# Patient Record
(demographics unavailable — no encounter records)

---

## 2024-10-07 NOTE — HISTORY OF PRESENT ILLNESS
[___ Month(s) Ago] : [unfilled] month(s) ago [History of Nephritis] : the patient has a history of nephritis [None] : The patient is currently asymptomatic [FreeTextEntry1] : Reports feeling well. No new concerns. Compliant with Imuran and HCQ. Planning natural pregnancy, LMP :10/6/2024  Denies photosensitivity, malar rash, mouth ulcers, alopecia, dry eyes, dry mouth, joint pains, Raynaud's phenomenon, puffy fingers, pleuritic chest pain, SOB , previous blood transfusion, limb weakness, weight loss, anorexia, fever, chills, other skin rash [FreeTextEntry3] : 2007 [FreeTextEntry7] :  TERRI, dsDNA, RNP, SSA, SSB, low C3. [FreeTextEntry4] : Azathioprine changed to Myfortic, then Benlysta, and back to Azathioprine

## 2024-10-07 NOTE — ASSESSMENT
[FreeTextEntry1] : 35 yo F PMH SLE (dx 2007) c/b LN class 5 (2007) and b/l hip AVN (s/p core decompression) is being followed up in our clinic since 2017.  # SLE LN  - Class V LN, in remission based on recent urine studies and CMP - No symptoms of SLE flare on history or exam  - Currently on Aza 200 mg daily and plaquenil 400 mg daily, c/w current regimen  - Will recheck SLE labs today  - Advised Ophtho f/u, denies visual symptoms, last seen in 2022, planning to see next month    # Contemplating pregnancy - S/p pregnancy 2023 with IUFD c/b fetal carditis, cholestasis s/p IVIG treatment and decadron  - SSA and SSB positive, B2 GP, ACL negative in 2023 - Followed closely with MFM and peds cards - 11/2023 with no FHR and vaginal delivery  - Plans to become pregnant; Will repeat SSA/RO52. Recommended sooner follow up if pregnant  # SCLE; remission - Stable - Not taking topicals - Follows with Derm - Advised to apply sunscreen   # R back pain; resolved - Suspect mechanical origin  - No red flag symptoms, exam overall unremarkable  - Given hx of pregnancy with prior delivery, there could be an element of secondary OA  - Will check x-rays b/l hips and SI joint - Referral given for PT   # Hyperbilirubinemia: resolved - Diagnosed with intrahepatic cholestasis of pregnancy  - Followed with Hepatology; now off ursodiol with resolved hyperbilirubinemia    # HMT - DEXA 2021, osteopenia - Pt not vaccinated for Covid or flu (refused)  - Pap smear done 5/2023 - Advised Ophtho f/u as above   RTC 3- months, sooner if gets pregnant  Discussed with Dr. Alfie Crabtree PGY4-Rheumatology Fellow

## 2024-10-07 NOTE — PHYSICAL EXAM
[General Appearance - Alert] : alert [General Appearance - In No Acute Distress] : in no acute distress [Sclera] : the sclera and conjunctiva were normal [PERRL With Normal Accommodation] : pupils were equal in size, round, and reactive to light [Extraocular Movements] : extraocular movements were intact [Outer Ear] : the ears and nose were normal in appearance [Examination Of The Oral Cavity] : the lips and gums were normal [Oropharynx] : the oropharynx was normal [] : no respiratory distress [Auscultation Breath Sounds / Voice Sounds] : lungs were clear to auscultation bilaterally [Heart Rate And Rhythm] : heart rate was normal and rhythm regular [Heart Sounds] : normal S1 and S2 [Murmurs] : no murmurs [Edema] : there was no peripheral edema [Bowel Sounds] : normal bowel sounds [Abdomen Soft] : soft [Abdomen Tenderness] : non-tender [No Focal Deficits] : no focal deficits [Impaired Insight] : insight and judgment were intact [Abnormal Walk] : normal gait [Nail Clubbing] : no clubbing  or cyanosis of the fingernails [Musculoskeletal - Swelling] : no joint swelling seen [Motor Tone] : muscle strength and tone were normal [FreeTextEntry1] : no synovitis, 5/5 strength upper and lower extremities bilaterally

## 2025-01-13 NOTE — HISTORY OF PRESENT ILLNESS
[___ Month(s) Ago] : [unfilled] month(s) ago [History of Nephritis] : the patient has a history of nephritis [None] : The patient is currently asymptomatic [FreeTextEntry1] : Jan 2025: Feels well, no new concerns. Still planning for pregnancy in 2-3 months.   Denies photosensitivity, malar rash, mouth ulcers, alopecia, dry eyes, dry mouth, joint pains, Raynaud's phenomenon, puffy fingers, pleuritic chest pain, SOB , previous blood transfusion, limb weakness, weight loss, anorexia, fever, chills, other skin rash oct 2024;Reports feeling well. No new concerns. Compliant with Imuran and HCQ. Planning natural pregnancy, LMP :10/6/2024  Denies photosensitivity, malar rash, mouth ulcers, alopecia, dry eyes, dry mouth, joint pains, Raynaud's phenomenon, puffy fingers, pleuritic chest pain, SOB , previous blood transfusion, limb weakness, weight loss, anorexia, fever, chills, other skin rash [FreeTextEntry3] : 2007 [FreeTextEntry7] :  TERRI, dsDNA, RNP, SSA, SSB, low C3. [FreeTextEntry4] : Azathioprine changed to Myfortic, then Benlysta, and back to Azathioprine

## 2025-01-13 NOTE — ASSESSMENT
[FreeTextEntry1] : 35 yo F PMH SLE (dx 2007) c/b LN class 5 (2007) and b/l hip AVN (s/p core decompression) is being followed up in our clinic since 2017.  # SLE / LN  - Class V LN, in remission based on recent urine studies and CMP - No symptoms of SLE flare on history or exam, , complements . UPCR negative , positive ds DNA - Currently on Aza 200 mg daily and plaquenil 400 mg daily, c/w current regimen  - Advised Ophtho f/u, denies visual symptoms, last seen in 2022, planning to see next month    # Contemplating pregnancy - S/p pregnancy 2023 with IUFD c/b fetal carditis, cholestasis s/p IVIG treatment and decadron  - SSA and SSB positive, B2 GP, ACL negative in 2023 - Followed closely with MFM and peds cards - 11/2023 with no FHR and vaginal delivery  - Plans to become pregnant; Will repeat SSA/RO52. Recommended sooner follow up if pregant -Tasked  for follow up for preconception counselling   # SCLE; remission - Stable - Not taking topicals - Follows with Derm - Advised to apply sunscreen   # R back pain; resolved - Suspect mechanical origin  - No red flag symptoms, exam overall unremarkable  - Given hx of pregnancy with prior delivery, there could be an element of secondary OA  - Will check x-rays b/l hips and SI joint - Referral given for PT   # Hyperbilirubinemia: resolved - Diagnosed with intrahepatic cholestasis of pregnancy  - Followed with Hepatology; now off ursodiol with resolved hyperbilirubinemia    # HMT - DEXA 2021, osteopenia - Pt not vaccinated for Covid or flu (refused)  - Pap smear done 5/2023 - Advised Ophtho f/u as above   RTC 3- months, sooner if gets pregnant. Counselled patient regarding follow up with MFM before pregnancy,  Discussed with Dr. Alfie Crabtree PGY4-Rheumatology Fellow

## 2025-04-07 NOTE — HISTORY OF PRESENT ILLNESS
[___ Month(s) Ago] : [unfilled] month(s) ago [History of Nephritis] : the patient has a history of nephritis [None] : The patient is currently asymptomatic [FreeTextEntry1] : 4/7/2025;  No new symptoms. Doing well. 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   [FreeTextEntry3] : 2007 [FreeTextEntry7] :  TERRI, dsDNA, RNP, SSA, SSB, low C3. [FreeTextEntry4] : Azathioprine changed to Myfortic, then Benlysta, and back to Azathioprine

## 2025-04-07 NOTE — ASSESSMENT
[FreeTextEntry1] : 37 yo F PMH SLE (dx 2007) c/b LN class 5 (2007) and b/l hip AVN (s/p core decompression) is being followed up in our clinic since 2017.  # SLE / LN  - Class V LN, in remission based on recent urine studies and CMP - No symptoms of SLE flare on history or exam, , complements. UPCR negative , positive ds DNA - Currently on Aza 200 mg daily and plaquenil 400 mg daily, c/w current regimen   # Contemplating pregnancy - S/p pregnancy 2023 with IUFD c/b fetal carditis, cholestasis s/p IVIG treatment and decadron  - SSA and SSB positive, B2 GP, ACL negative in 2023 - Followed closely with MFM and peds cards - 11/2023 with no FHR and vaginal delivery  - Plans to become pregnant; Will repeat SSA/RO52. Recommended sooner follow up if pregant -Tasked  for follow up for preconception counselling  -Recommended MFM follow up before next pregnancy planning  # SCLE; remission - Stable - Not taking topicals - Follows with Derm - Advised to apply sunscreen   # R back pain; resolved - Suspect mechanical origin  - No red flag symptoms, exam overall unremarkable  - Given hx of pregnancy with prior delivery, there could be an element of secondary OA  - Will check x-rays b/l hips and SI joint - Referral given for PT   # Hyperbilirubinemia: resolved - Diagnosed with intrahepatic cholestasis of pregnancy  - Followed with Hepatology; now off ursodiol with resolved hyperbilirubinemia   #Thyromegaly -H/o hypothyroidism -Will get TSH , US thyroid   # HMT - DEXA 2021, osteopenia - Pt not vaccinated for Covid or flu (refused)  - Pap smear done 5/2023 - OPtho visit in march 2025: no HCQ toxicity  RTC 3- months, sooner if gets pregnant. Counselled patient regarding follow up with MFM before pregnancy,  Discussed with Dr. Alfie Crabtree PGY4-Rheumatology Fellow